# Patient Record
Sex: MALE | Race: WHITE | NOT HISPANIC OR LATINO | Employment: FULL TIME | URBAN - NONMETROPOLITAN AREA
[De-identification: names, ages, dates, MRNs, and addresses within clinical notes are randomized per-mention and may not be internally consistent; named-entity substitution may affect disease eponyms.]

---

## 2020-10-20 ENCOUNTER — OFFICE VISIT (OUTPATIENT)
Dept: CARDIOLOGY | Facility: CLINIC | Age: 55
End: 2020-10-20

## 2020-10-20 VITALS
BODY MASS INDEX: 31.71 KG/M2 | TEMPERATURE: 98.4 F | DIASTOLIC BLOOD PRESSURE: 88 MMHG | WEIGHT: 202 LBS | HEIGHT: 67 IN | HEART RATE: 60 BPM | SYSTOLIC BLOOD PRESSURE: 154 MMHG

## 2020-10-20 DIAGNOSIS — E78.00 HYPERCHOLESTEREMIA: ICD-10-CM

## 2020-10-20 DIAGNOSIS — R06.02 SHORTNESS OF BREATH: ICD-10-CM

## 2020-10-20 DIAGNOSIS — I10 ESSENTIAL HYPERTENSION: ICD-10-CM

## 2020-10-20 DIAGNOSIS — E88.81 METABOLIC SYNDROME: ICD-10-CM

## 2020-10-20 DIAGNOSIS — R07.89 OTHER CHEST PAIN: Primary | ICD-10-CM

## 2020-10-20 PROCEDURE — 99244 OFF/OP CNSLTJ NEW/EST MOD 40: CPT | Performed by: INTERNAL MEDICINE

## 2020-10-20 PROCEDURE — 93000 ELECTROCARDIOGRAM COMPLETE: CPT | Performed by: INTERNAL MEDICINE

## 2020-10-20 RX ORDER — CHOLECALCIFEROL (VITAMIN D3) 50 MCG
2000 TABLET ORAL DAILY
COMMUNITY

## 2020-10-20 NOTE — PROGRESS NOTES
"Chief Complaint   Patient presents with   • Establish Care     PCP referred, HTN, SOB, family history cardiac problems   • Chest Pain     reports having sharp pain occasionally in the morning when he wakes up or late in the afternoon. Not really associated with exertion.    • Shortness of Breath     episode while playing with his grandkid's   • Hypertension     diagnosed last week, PCP started Lisinopril 10 mg daily, took one tab one day, \" dropped my BP to much, I didn't take it anymore\"  107/60   • Labs     most recent labs in chart, has never been on cholesterol medication   • Aspirin     does not take a daily aspirin   • Cardiac history     none        CARDIAC COMPLAINTS  Chest pain and dyspnea      Subjective   Bruce Carrera is a 55 y.o. male came in today for his initial cardiac evaluation.  He has no previously diagnosed hypertension or diabetes.  He has family history of ischemic heart disease.  He has been having problems in the form of sharp left-sided chest pain.  It occurs anytime of the day with no precipitating factors.  He sometimes wakes up in the morning with a sharp pain lasting for few seconds to few minutes.  In the scale of 1-10 it is around 5.  It is not associated with any particular activities and it is not associated with shortness of breath or palpitation.  It is not associated with any nausea or diaphoresis.  He try to do regular exercise.  He tries to walk at least 2 to 3 miles on the treadmill without any problem.  When he plays with his grandchildren though he developed shortness of breath.  He does not lift anything heavy.  He does not take any regular medications other than vitamin D and fish oil.  He did undergo some lab work at your office recently.  His TSH was normal.  His B12 and folic acid level were normal.  His hemoglobin is mildly low at 12 with a crit of 38.  His platelets were normal.  His cholesterol was 197 with a triglyceride of 191 and LDL of 118 with a HDL of 45.  His " renal function and the liver function were within normal limits.  He has no history of smoking.  He drinks about 6 cans of beer a week.  As stated he does have a strong family history of ischemic heart disease.  His father diagnosed with ischemic heart disease when he was quite young and his maternal grandfather also  with an MI.    History reviewed. No pertinent surgical history.    Current Outpatient Medications   Medication Sig Dispense Refill   • Cholecalciferol (Vitamin D) 50 MCG (2000 UT) tablet Take 2,000 Units by mouth Daily.     • Omega-3 Fatty Acids (FISH OIL CONCENTRATE PO) Take  by mouth Daily.       No current facility-administered medications for this visit.            ALLERGIES:  Patient has no known allergies.    Past Medical History:   Diagnosis Date   • Family history of thalassemia    • History of right knee surgery    • Hyperlipidemia    • Vitamin D deficiency        Social History     Tobacco Use   Smoking Status Never Smoker   Smokeless Tobacco Never Used          Family History   Problem Relation Age of Onset   • No Known Problems Mother    • Valvular heart disease Father         s/p valve replacement   • Heart attack Father    • Heart disease Father         CABG, stenting   • Hypertension Father    • Hyperlipidemia Father    • No Known Problems Sister    • No Known Problems Brother    • Other Maternal Grandmother         medical history unknown   • Heart attack Maternal Grandfather    • Cancer Paternal Grandmother    • Other Paternal Grandfather         medical history unknown   • No Known Problems Son    • No Known Problems Son        Review of Systems   Constitution: Negative for decreased appetite and malaise/fatigue.   HENT: Negative for congestion and sore throat.    Eyes: Negative for blurred vision.   Cardiovascular: Positive for chest pain and dyspnea on exertion.   Respiratory: Positive for shortness of breath. Negative for snoring.    Endocrine: Negative for cold intolerance and  "heat intolerance.   Hematologic/Lymphatic: Negative for adenopathy. Does not bruise/bleed easily.   Skin: Negative for itching, nail changes and skin cancer.   Musculoskeletal: Negative for arthritis and myalgias.   Gastrointestinal: Negative for abdominal pain, dysphagia and heartburn.   Genitourinary: Negative for bladder incontinence and frequency.   Neurological: Negative for dizziness, light-headedness, seizures and vertigo.   Psychiatric/Behavioral: Negative for altered mental status.   Allergic/Immunologic: Negative for environmental allergies and hives.       Diabetes- No  Thyroid- normal    Objective     /88 (BP Location: Left arm)   Pulse 60   Temp 98.4 °F (36.9 °C)   Ht 170.2 cm (67\")   Wt 91.6 kg (202 lb)   BMI 31.64 kg/m²     Vitals signs reviewed.   Constitutional:       Appearance: Healthy appearance. Not in distress.   Eyes:      Conjunctiva/sclera: Conjunctivae normal.      Pupils: Pupils are equal, round, and reactive to light.   HENT:      Head: Normocephalic.   Neck:      Musculoskeletal: Normal range of motion and neck supple.   Pulmonary:      Effort: Pulmonary effort is normal.      Breath sounds: Normal breath sounds.   Cardiovascular:      PMI at left midclavicular line. Normal rate. Regular rhythm.      Murmurs: There is a grade 3/6 mid frequency midsystolic murmur at the apex.   Abdominal:      General: Bowel sounds are normal.      Palpations: Abdomen is soft.   Musculoskeletal: Normal range of motion.   Skin:     General: Skin is warm and dry.   Neurological:      Mental Status: Alert, oriented to person, place, and time and oriented to person, place and time.           ECG 12 Lead    Date/Time: 10/20/2020 1:45 PM  Performed by: Estiven José MD  Authorized by: Estiven José MD   Previous ECG: no previous ECG available  Rhythm: sinus rhythm  Rate: normal  QRS axis: normal    Clinical impression: normal ECG              Assessment/Plan   Patient's Body mass index " is 31.64 kg/m². BMI is above normal parameters. Recommendations include: educational material, exercise counseling and nutrition counseling.     Diagnoses and all orders for this visit:    1. Other chest pain (Primary)  -     Treadmill Stress Test; Future    2. Shortness of breath  -     Adult Transthoracic Echo Complete W/ Cont if Necessary Per Protocol; Future    3. Essential hypertension    4. Hypercholesteremia    5. Metabolic syndrome       At baseline his heart rate is stable.  His blood pressure is mildly elevated.  His EKG shows sinus rhythm, no LVH, no ST-T changes.  His clinical examination reveals I schedule him to undergo a stress test BMI of 32.  His cardiovascular examination revealed 3/6 short systolic murmur at the mitral area he has no pedal edema and normal peripheral pulses.    Regarding the chest pain, it does appear to be very atypical.  It is sharp in nature occurring both on exertion as well as at rest.  His EKG did not show any acute changes.  He does have strong family history of ischemic heart disease and he does have elevated cholesterol.  I schedule him to undergo a stress test to evaluate his functional status, chronotropic response, blood pressure response as well as to look for any stress-induced ischemia or arrhythmia.  If he does have increased blood pressure response, need to consider lower dose of ACE inhibitor.    Regarding his shortness of breath, it could be secondary to his metabolic syndrome.  I talked to him about diet and weight reduction.  I gave him papers on Mediterranean diet.  I also schedule him to undergo an echocardiogram to evaluate for LVH, valvular structures, LV function as well as the PA pressure.    Regarding his blood pressure, I talked to him about cutting down on the salt intake.  If the stress test does show increased blood pressure response then that needs to be addressed    Regarding his hypercholesterolemia, advised him to be on the Mediterranean diet  and also talked to him about intermittent fasting diet.  Need to recheck his lipids in about 6 months and if the LDL is still elevated then he needs to be on a statin.  If the stress test shows any changes then he also need to be considered for a static    Regarding his metabolic syndrome, talk to him about continuing daily exercise program as before and to be on a low-carb diet.    Based on the results and his response, further recommendations will be made.             Electronically signed by Estiven José MD October 20, 2020 13:34 EDT

## 2020-10-20 NOTE — PATIENT INSTRUCTIONS
Mediterranean Diet  A Mediterranean diet refers to food and lifestyle choices that are based on the traditions of countries located on the [place]. This way of eating has been shown to help prevent certain conditions and improve outcomes for people who have chronic diseases, like kidney disease and heart disease.  What are tips for following this plan?  Lifestyle  · Cook and eat meals together with your family, when possible.  · Drink enough fluid to keep your urine clear or pale yellow.  · Be physically active every day. This includes:  ? Aerobic exercise like running or swimming.  ? Leisure activities like gardening, walking, or housework.  · Get 7-8 hours of sleep each night.  · If recommended by your health care provider, drink red wine in moderation. This means 1 glass a day for nonpregnant women and 2 glasses a day for men. A glass of wine equals 5 oz (150 mL).  Reading food labels    · Check the serving size of packaged foods. For foods such as rice and pasta, the serving size refers to the amount of cooked product, not dry.  · Check the total fat in packaged foods. Avoid foods that have saturated fat or trans fats.  · Check the ingredients list for added sugars, such as corn syrup.  Shopping  · At the grocery store, buy most of your food from the areas near the walls of the store. This includes:  ? Fresh fruits and vegetables (produce).  ? Grains, beans, nuts, and seeds. Some of these may be available in unpackaged forms or large amounts (in bulk).  ? Fresh seafood.  ? Poultry and eggs.  ? Low-fat dairy products.  · Buy whole ingredients instead of prepackaged foods.  · Buy fresh fruits and vegetables in-season from local farmers markets.  · Buy frozen fruits and vegetables in resealable bags.  · If you do not have access to quality fresh seafood, buy precooked frozen shrimp or canned fish, such as tuna, salmon, or sardines.  · Buy small amounts of raw or cooked vegetables, salads, or olives from  the deli or salad bar at your store.  · Stock your pantry so you always have certain foods on hand, such as olive oil, canned tuna, canned tomatoes, rice, pasta, and beans.  Cooking  · Cook foods with extra-virgin olive oil instead of using butter or other vegetable oils.  · Have meat as a side dish, and have vegetables or grains as your main dish. This means having meat in small portions or adding small amounts of meat to foods like pasta or stew.  · Use beans or vegetables instead of meat in common dishes like chili or lasagna.  · [place] with different cooking methods. Try roasting or broiling vegetables instead of steaming or sautéeing them.  · Add frozen vegetables to soups, stews, pasta, or rice.  · Add nuts or seeds for added healthy fat at each meal. You can add these to yogurt, salads, or vegetable dishes.  · Marinate fish or vegetables using olive oil, lemon juice, garlic, and fresh herbs.  Meal planning    · Plan to eat 1 vegetarian meal one day each week. Try to work up to 2 vegetarian meals, if possible.  · Eat seafood 2 or more times a week.  · Have healthy snacks readily available, such as:  ? Vegetable sticks with hummus.  ? Greek yogurt.  ? Fruit and nut trail mix.  · Eat balanced meals throughout the week. This includes:  ? Fruit: 2-3 servings a day  ? Vegetables: 4-5 servings a day  ? Low-fat dairy: 2 servings a day  ? Fish, poultry, or lean meat: 1 serving a day  ? Beans and legumes: 2 or more servings a week  ? Nuts and seeds: 1-2 servings a day  ? Whole grains: 6-8 servings a day  ? Extra-virgin olive oil: 3-4 servings a day  · Limit red meat and sweets to only a few servings a month  What are my food choices?  · Mediterranean diet  ? Recommended  § Grains: Whole-grain pasta. Brown rice. Bulgar wheat. Polenta. Couscous. Whole-wheat bread. Oatmeal. Quinoa.  § Vegetables: Artichokes. Beets. Broccoli. Cabbage. Carrots. Eggplant. Green beans. Chard. Kale. Spinach. Onions. Leeks. Peas. Squash.  Tomatoes. Peppers. Radishes.  § Fruits: Apples. Apricots. Avocado. Berries. Bananas. Cherries. Dates. Figs. Grapes. Jocelin. Melon. Oranges. Peaches. Plums. Pomegranate.  § Meats and other protein foods: Beans. Almonds. Sunflower seeds. Pine nuts. Peanuts. Cod. [place]. Scallops. Shrimp. Tuna. Tilapia. Clams. Oysters. Eggs.  § Dairy: Low-fat milk. Cheese. Greek yogurt.  § Beverages: Water. Red wine. Herbal tea.  § Fats and oils: Extra virgin olive oil. Avocado oil. Grape seed oil.  § Sweets and desserts: Greek yogurt with honey. Baked apples. Poached pears. Trail mix.  § Seasoning and other foods: Basil. Cilantro. Coriander. Cumin. Mint. Parsley. Eduardo. Rosemary. Tarragon. Garlic. Oregano. Thyme. Pepper. Balsalmic vinegar. Tahini. Hummus. Tomato sauce. Olives. Mushrooms.  ? Limit these  § Grains: Prepackaged pasta or rice dishes. Prepackaged cereal with added sugar.  § Vegetables: Deep fried potatoes (french fries).  § Fruits: Fruit canned in syrup.  § Meats and other protein foods: Beef. Pork. Lamb. Poultry with skin. Hot dogs. [place].  § Dairy: Ice cream. Sour cream. Whole milk.  § Beverages: Juice. Sugar-sweetened soft drinks. Beer. Liquor and spirits.  § Fats and oils: Butter. Canola oil. Vegetable oil. Beef fat (tallow). Lard.  § Sweets and desserts: Cookies. Cakes. Pies. Candy.  § Seasoning and other foods: Mayonnaise. Premade sauces and marinades.  The items listed may not be a complete list. Talk with your dietitian about what dietary choices are right for you.  Summary  · The Mediterranean diet includes both food and lifestyle choices.  · Eat a variety of fresh fruits and vegetables, beans, nuts, seeds, and whole grains.  · Limit the amount of red meat and sweets that you eat.  · Talk with your health care provider about whether it is safe for you to drink red wine in moderation. This means 1 glass a day for nonpregnant women and 2 glasses a day for men. A glass of wine equals 5 oz (150 mL).  This information  is not intended to replace advice given to you by your health care provider. Make sure you discuss any questions you have with your health care provider.  Document Released: 08/10/2017 Document Revised: 08/17/2017 Document Reviewed: 08/10/2017  Elsevier Patient Education © 2020 [employer]

## 2020-10-27 ENCOUNTER — HOSPITAL ENCOUNTER (OUTPATIENT)
Dept: CARDIOLOGY | Facility: HOSPITAL | Age: 55
Discharge: HOME | End: 2020-10-27

## 2020-10-27 VITALS — HEIGHT: 67 IN | WEIGHT: 201.94 LBS | BODY MASS INDEX: 31.7 KG/M2

## 2020-10-27 DIAGNOSIS — R07.89 OTHER CHEST PAIN: ICD-10-CM

## 2020-10-27 DIAGNOSIS — R06.02 SHORTNESS OF BREATH: ICD-10-CM

## 2020-10-27 LAB
AORTIC DIMENSIONLESS INDEX: 0.9 (DI)
BH CV ECHO MEAS - ACS: 1.6 CM
BH CV ECHO MEAS - AO MAX PG (FULL): 1.1 MMHG
BH CV ECHO MEAS - AO MAX PG: 5.3 MMHG
BH CV ECHO MEAS - AO MEAN PG (FULL): 1 MMHG
BH CV ECHO MEAS - AO MEAN PG: 3 MMHG
BH CV ECHO MEAS - AO ROOT AREA (BSA CORRECTED): 1.5
BH CV ECHO MEAS - AO ROOT AREA: 7.5 CM^2
BH CV ECHO MEAS - AO ROOT DIAM: 3.1 CM
BH CV ECHO MEAS - AO V2 MAX: 115 CM/SEC
BH CV ECHO MEAS - AO V2 MEAN: 72.7 CM/SEC
BH CV ECHO MEAS - AO V2 VTI: 26.5 CM
BH CV ECHO MEAS - BSA(HAYCOCK): 2.1 M^2
BH CV ECHO MEAS - BSA: 2 M^2
BH CV ECHO MEAS - BZI_BMI: 31.6 KILOGRAMS/M^2
BH CV ECHO MEAS - BZI_METRIC_HEIGHT: 170.2 CM
BH CV ECHO MEAS - BZI_METRIC_WEIGHT: 91.6 KG
BH CV ECHO MEAS - EDV(CUBED): 139.8 ML
BH CV ECHO MEAS - EDV(TEICH): 128.9 ML
BH CV ECHO MEAS - EF(CUBED): 77 %
BH CV ECHO MEAS - EF(TEICH): 68.7 %
BH CV ECHO MEAS - ESV(CUBED): 32.2 ML
BH CV ECHO MEAS - ESV(TEICH): 40.3 ML
BH CV ECHO MEAS - FS: 38.7 %
BH CV ECHO MEAS - IVS/LVPW: 0.92
BH CV ECHO MEAS - IVSD: 0.84 CM
BH CV ECHO MEAS - LA 3D VOL INDEX: 48
BH CV ECHO MEAS - LA DIMENSION(2D): 4.3 CM
BH CV ECHO MEAS - LA DIMENSION: 4.1 CM
BH CV ECHO MEAS - LA/AO: 1.3
BH CV ECHO MEAS - LAT PEAK E' VEL: 11.8 CM/SEC
BH CV ECHO MEAS - LV IVRT: 0.11 SEC
BH CV ECHO MEAS - LV MASS(C)D: 163.7 GRAMS
BH CV ECHO MEAS - LV MASS(C)DI: 80.6 GRAMS/M^2
BH CV ECHO MEAS - LV MAX PG: 4.2 MMHG
BH CV ECHO MEAS - LV MEAN PG: 2 MMHG
BH CV ECHO MEAS - LV V1 MAX: 102 CM/SEC
BH CV ECHO MEAS - LV V1 MEAN: 63.4 CM/SEC
BH CV ECHO MEAS - LV V1 VTI: 25.1 CM
BH CV ECHO MEAS - LVIDD: 5.2 CM
BH CV ECHO MEAS - LVIDS: 3.2 CM
BH CV ECHO MEAS - LVPWD: 0.92 CM
BH CV ECHO MEAS - MED PEAK E' VEL: 7.1 CM/SEC
BH CV ECHO MEAS - MV A MAX VEL: 73.8 CM/SEC
BH CV ECHO MEAS - MV DEC SLOPE: 395 CM/SEC^2
BH CV ECHO MEAS - MV DEC TIME: 0.21 SEC
BH CV ECHO MEAS - MV E MAX VEL: 88.4 CM/SEC
BH CV ECHO MEAS - MV E/A: 1.2
BH CV ECHO MEAS - MV MAX PG: 3.3 MMHG
BH CV ECHO MEAS - MV MEAN PG: 2 MMHG
BH CV ECHO MEAS - MV P1/2T MAX VEL: 107 CM/SEC
BH CV ECHO MEAS - MV P1/2T: 79.3 MSEC
BH CV ECHO MEAS - MV V2 MAX: 90.4 CM/SEC
BH CV ECHO MEAS - MV V2 MEAN: 60.1 CM/SEC
BH CV ECHO MEAS - MV V2 VTI: 44.1 CM
BH CV ECHO MEAS - MVA P1/2T LCG: 2.1 CM^2
BH CV ECHO MEAS - MVA(P1/2T): 2.8 CM^2
BH CV ECHO MEAS - PA MAX PG (FULL): 1.8 MMHG
BH CV ECHO MEAS - PA MAX PG: 5.1 MMHG
BH CV ECHO MEAS - PA MEAN PG (FULL): 1 MMHG
BH CV ECHO MEAS - PA MEAN PG: 3 MMHG
BH CV ECHO MEAS - PA V2 MAX: 113 CM/SEC
BH CV ECHO MEAS - PA V2 MEAN: 75.5 CM/SEC
BH CV ECHO MEAS - PA V2 VTI: 29.1 CM
BH CV ECHO MEAS - RAP SYSTOLE: 10 MMHG
BH CV ECHO MEAS - RV MAX PG: 3.3 MMHG
BH CV ECHO MEAS - RV MEAN PG: 2 MMHG
BH CV ECHO MEAS - RV V1 MAX: 90.7 CM/SEC
BH CV ECHO MEAS - RV V1 MEAN: 57.5 CM/SEC
BH CV ECHO MEAS - RV V1 VTI: 24.8 CM
BH CV ECHO MEAS - RVDD: 2.8 CM
BH CV ECHO MEAS - RVSP: 25 MMHG
BH CV ECHO MEAS - SI(AO): 98.5 ML/M^2
BH CV ECHO MEAS - SI(CUBED): 53 ML/M^2
BH CV ECHO MEAS - SI(TEICH): 43.6 ML/M^2
BH CV ECHO MEAS - SV(AO): 200 ML
BH CV ECHO MEAS - SV(CUBED): 107.6 ML
BH CV ECHO MEAS - SV(TEICH): 88.6 ML
BH CV ECHO MEAS - TR MAX VEL: 192 CM/SEC
BH CV ECHO MEASUREMENTS AVERAGE E/E' RATIO: 9.35
BH CV STRESS RECOVERY BP: NORMAL MMHG
BH CV STRESS RECOVERY HR: 82 BPM
MAXIMAL PREDICTED HEART RATE: 165 BPM
MAXIMAL PREDICTED HEART RATE: 165 BPM
PERCENT MAX PREDICTED HR: 88.48 %
STRESS BASELINE BP: NORMAL MMHG
STRESS BASELINE HR: 55 BPM
STRESS PERCENT HR: 104 %
STRESS POST ESTIMATED WORKLOAD: 10.1 METS
STRESS POST EXERCISE DUR MIN: 8 MIN
STRESS POST EXERCISE DUR SEC: 45 SEC
STRESS POST PEAK BP: NORMAL MMHG
STRESS POST PEAK HR: 146 BPM
STRESS TARGET HR: 140 BPM
STRESS TARGET HR: 140 BPM

## 2020-10-27 PROCEDURE — 93016 CV STRESS TEST SUPVJ ONLY: CPT | Performed by: NURSE PRACTITIONER

## 2020-10-27 PROCEDURE — 93356 MYOCRD STRAIN IMG SPCKL TRCK: CPT | Performed by: INTERNAL MEDICINE

## 2020-10-27 PROCEDURE — 93018 CV STRESS TEST I&R ONLY: CPT | Performed by: INTERNAL MEDICINE

## 2020-10-27 PROCEDURE — 93356 MYOCRD STRAIN IMG SPCKL TRCK: CPT

## 2020-10-27 PROCEDURE — 93017 CV STRESS TEST TRACING ONLY: CPT

## 2020-10-27 PROCEDURE — 93306 TTE W/DOPPLER COMPLETE: CPT | Performed by: INTERNAL MEDICINE

## 2020-10-27 PROCEDURE — 93306 TTE W/DOPPLER COMPLETE: CPT

## 2020-10-29 RX ORDER — METOPROLOL SUCCINATE 25 MG/1
25 TABLET, EXTENDED RELEASE ORAL DAILY
Qty: 30 TABLET | Refills: 11 | Status: SHIPPED | OUTPATIENT
Start: 2020-10-29 | End: 2021-04-20 | Stop reason: SDUPTHER

## 2020-10-29 NOTE — TELEPHONE ENCOUNTER
Patient was made aware of results of stress test and echo. Increased chronotropic and blood pressure response. EF 56-60%. Patient was made aware of adding metoprolol XL 25 mg daily. Patient was made aware to monitor BP and HR.     Script for metoprolol XL 25 mg daily is pended with 30 tablets and 11 refills to be sent to [facility].

## 2021-04-20 ENCOUNTER — OFFICE VISIT (OUTPATIENT)
Dept: CARDIOLOGY | Facility: CLINIC | Age: 56
End: 2021-04-20

## 2021-04-20 VITALS
DIASTOLIC BLOOD PRESSURE: 90 MMHG | SYSTOLIC BLOOD PRESSURE: 138 MMHG | TEMPERATURE: 98.4 F | WEIGHT: 193 LBS | BODY MASS INDEX: 30.29 KG/M2 | HEART RATE: 72 BPM | HEIGHT: 67 IN

## 2021-04-20 DIAGNOSIS — I10 ESSENTIAL HYPERTENSION: Primary | ICD-10-CM

## 2021-04-20 DIAGNOSIS — E66.9 OBESITY (BMI 30.0-34.9): ICD-10-CM

## 2021-04-20 DIAGNOSIS — E78.00 HYPERCHOLESTEREMIA: ICD-10-CM

## 2021-04-20 PROCEDURE — 99214 OFFICE O/P EST MOD 30 MIN: CPT | Performed by: NURSE PRACTITIONER

## 2021-04-20 RX ORDER — METOPROLOL SUCCINATE 25 MG/1
25 TABLET, EXTENDED RELEASE ORAL DAILY
Qty: 30 TABLET | Refills: 11 | Status: SHIPPED | OUTPATIENT
Start: 2021-04-20 | End: 2021-11-02 | Stop reason: SDDI

## 2021-04-20 RX ORDER — OMEPRAZOLE 40 MG/1
40 CAPSULE, DELAYED RELEASE ORAL DAILY
COMMUNITY

## 2021-04-20 NOTE — PROGRESS NOTES
Chief Complaint   Patient presents with   • Follow-up     cardiac management. pt not taking metoprolol due to the way it made him feel. denies any cardiac complaints.   • Med Refill     will need cardiac meds refilled 90 days to [facility] [place]   • Labs     last labs 9/20 in chart   • Aspirin     is not on daily ASA   • Fatigue     daily.       Cardiac Complaints  fatigue      Subjective   Bruce Carrera is a 55 y.o. male with HTN and hyperlipidemia, who was referred for SOA and family history of IHD in October 2020. He was advised to undergo stress and echo testing to assess for ischemia, LV dysfunction, and valve concerns. Stress was negative for ischemia but did show mild increase in both chronotropic and HTN response, low dose of toprol added once daily.  Lipids were high at consult with LDL of 117 and patient advised on Mediterranean diet with recheck in 6 months, he has continued on fish oil.    He returns today for follow up and cardiac concerns are denied. He reports active lifestyle often walking 3 miles a day without concerns. He does state he has not been taking his BB as was recommended after stress. He reports BP has been okay at home. He is following with you soon for repeat labs.         Cardiac History  Past Surgical History:   Procedure Laterality Date   • CARDIOVASCULAR STRESS TEST  10/27/2020    R.Stress- 8 Min, 45 Secs. 10.1 METS. 88% THR. BP- 168/92. Negative.   • ECHO - CONVERTED  10/27/2020    EF 60%. LA- 4.1 Cm. Mild MR. RVSP- 25 mmHg.       Current Outpatient Medications   Medication Sig Dispense Refill   • Cholecalciferol (Vitamin D) 50 MCG (2000 UT) tablet Take 2,000 Units by mouth Daily.     • Omega-3 Fatty Acids (FISH OIL CONCENTRATE PO) Take  by mouth Daily.     • omeprazole (priLOSEC) 40 MG capsule Take 40 mg by mouth Daily.     • metoprolol succinate XL (TOPROL-XL) 25 MG 24 hr tablet Take 1 tablet by mouth Daily. 30 tablet 11     No current facility-administered medications for  this visit.       Patient has no known allergies.    Past Medical History:   Diagnosis Date   • Family history of thalassemia    • History of right knee surgery    • Hyperlipidemia    • Vitamin D deficiency        Social History     Socioeconomic History   • Marital status:      Spouse name: Not on file   • Number of children: Not on file   • Years of education: Not on file   • Highest education level: Not on file   Tobacco Use   • Smoking status: Never Smoker   • Smokeless tobacco: Never Used   Vaping Use   • Vaping Use: Never used   Substance and Sexual Activity   • Alcohol use: Yes     Alcohol/week: 6.0 standard drinks     Types: 6 Cans of beer per week   • Drug use: Never       Family History   Problem Relation Age of Onset   • No Known Problems Mother    • Valvular heart disease Father         s/p valve replacement   • Heart attack Father    • Heart disease Father         CABG, stenting   • Hypertension Father    • Hyperlipidemia Father    • No Known Problems Sister    • No Known Problems Brother    • Other Maternal Grandmother         medical history unknown   • Heart attack Maternal Grandfather    • Cancer Paternal Grandmother    • Other Paternal Grandfather         medical history unknown   • No Known Problems Son    • No Known Problems Son        Review of Systems   Constitutional: Negative for malaise/fatigue and night sweats.   Cardiovascular: Negative for chest pain, claudication, dyspnea on exertion, irregular heartbeat, leg swelling, near-syncope, orthopnea, palpitations and syncope.   Respiratory: Negative for cough, shortness of breath and wheezing.    Musculoskeletal: Negative for back pain, joint pain, joint swelling and stiffness.   Gastrointestinal: Negative for anorexia, heartburn, melena, nausea and vomiting.   Genitourinary: Negative for dysuria, hematuria, hesitancy and nocturia.   Neurological: Negative for dizziness, headaches and light-headedness.   Psychiatric/Behavioral: Negative  "for depression and memory loss. The patient is not nervous/anxious.            Objective     /90 (BP Location: Right arm)   Pulse 72   Temp 98.4 °F (36.9 °C)   Ht 170 cm (66.93\")   Wt 87.5 kg (193 lb)   BMI 30.29 kg/m²     Constitutional:       Appearance: Not in distress.   Eyes:      Pupils: Pupils are equal, round, and reactive to light.   HENT:      Nose: Nose normal.   Pulmonary:      Effort: Pulmonary effort is normal.      Breath sounds: Normal breath sounds.   Cardiovascular:      PMI at left midclavicular line. Normal rate. Regular rhythm.      Murmurs: There is a systolic murmur.   Abdominal:      Palpations: Abdomen is soft.   Musculoskeletal: Normal range of motion.      Cervical back: Normal range of motion and neck supple. Skin:     General: Skin is warm and dry.   Neurological:      Mental Status: Oriented to person, place and time.         Procedures    Assessment/Plan     HTN:  Blood pressure stable but slightly elevated. He is hesitant to start BP meds and states at home it is well managed. He was urged to try 1/2 tablet per day if needed vs whole tablet.     Increased chronotropic response:  Has been holding BB. He was urged to check HR with exertion as low dose metoprolol may be needed.     Hyperlipidemia:  LDL high at consult, will follow with you soon for repeat labs.  He will continue fish oil therapy.  If lipids high, he will be urged to discuss statin with you.    BMI noted at 30.29 with weight loss noted, patient praised for his efforts.     Refills per request.    6 month follow up recommended or sooner if needed.                 Problems Addressed this Visit        Cardiac and Vasculature    Hypercholesteremia    Essential hypertension - Primary    Relevant Medications    metoprolol succinate XL (TOPROL-XL) 25 MG 24 hr tablet      Other Visit Diagnoses     Obesity (BMI 30.0-34.9)          Diagnoses       Codes Comments    Essential hypertension    -  Primary ICD-10-CM: " I10  ICD-9-CM: 401.9     Hypercholesteremia     ICD-10-CM: E78.00  ICD-9-CM: 272.0     Obesity (BMI 30.0-34.9)     ICD-10-CM: E66.9  ICD-9-CM: 278.00           Patient's Body mass index is 30.29 kg/m². BMI is above normal parameters. Recommendations include: nutrition counseling.            Electronically signed by NATALIE Estrada April 21, 2021 08:44 EDT

## 2021-11-02 ENCOUNTER — OFFICE VISIT (OUTPATIENT)
Dept: CARDIOLOGY | Facility: CLINIC | Age: 56
End: 2021-11-02

## 2021-11-02 VITALS
TEMPERATURE: 97.5 F | HEIGHT: 66 IN | WEIGHT: 198.4 LBS | DIASTOLIC BLOOD PRESSURE: 84 MMHG | HEART RATE: 88 BPM | BODY MASS INDEX: 31.88 KG/M2 | SYSTOLIC BLOOD PRESSURE: 146 MMHG

## 2021-11-02 DIAGNOSIS — R00.2 PALPITATIONS: ICD-10-CM

## 2021-11-02 DIAGNOSIS — E66.2 CLASS 1 OBESITY WITH ALVEOLAR HYPOVENTILATION, SERIOUS COMORBIDITY, AND BODY MASS INDEX (BMI) OF 30.0 TO 30.9 IN ADULT (HCC): ICD-10-CM

## 2021-11-02 DIAGNOSIS — I10 ESSENTIAL HYPERTENSION: Primary | ICD-10-CM

## 2021-11-02 DIAGNOSIS — E78.00 HYPERCHOLESTEREMIA: ICD-10-CM

## 2021-11-02 PROCEDURE — 99213 OFFICE O/P EST LOW 20 MIN: CPT | Performed by: NURSE PRACTITIONER

## 2021-11-02 NOTE — PROGRESS NOTES
Chief Complaint   Patient presents with   • Follow-up     Pt here for cardiac follow up. Pt denies SP, SOB, dizziness, or palpitations.  Pt does not take a daily ASA.   • Med Refill     Pt has not been taking his metoprolol.     • Lab Work     Pt had last labs about 2 months ago.  He states it was at Dr Henao's office.       Cardiac Complaints  none      Subjective   Bruce Carrera is a 56 y.o. male with HTN and hyperlipidemia, who was referred for SOA and family history of IHD in October 2020. He was advised to undergo stress and echo testing to assess for ischemia, LV dysfunction, and valve concerns. Stress was negative for ischemia but did show mild increase in both chronotropic and HTN response, low dose of toprol added once daily.  Lipids were high at consult with LDL of 117 and patient advised on Mediterranean diet with recheck in 6 months, he has continued on fish oil.    Patient comes today for follow up and denies any CP, SOB, dizziness, or palpitations. He states he has not been taking metoprolol therapy because he admitted his BP had been well managed. He also reports HR has been WNL. He reported he had been watching his diet and exercising more which he thought helped, but now he is not walking as frequently or eating as well as he was. He reports blood work recently with [facility] for Thalassemia. Not currently taking prescription wanted.      Cardiac History  Past Surgical History:   Procedure Laterality Date   • CARDIOVASCULAR STRESS TEST  10/27/2020    R.Stress- 8 Min, 45 Secs. 10.1 METS. 88% THR. BP- 168/92. Negative.   • ECHO - CONVERTED  10/27/2020    EF 60%. LA- 4.1 Cm. Mild MR. RVSP- 25 mmHg.       Current Outpatient Medications   Medication Sig Dispense Refill   • Omega-3 Fatty Acids (FISH OIL CONCENTRATE PO) Take  by mouth Daily.     • omeprazole (priLOSEC) 40 MG capsule Take 40 mg by mouth Daily.     • Cholecalciferol (Vitamin D) 50 MCG (2000 UT) tablet Take 2,000 Units by mouth Daily.       No  current facility-administered medications for this visit.       Patient has no known allergies.    Past Medical History:   Diagnosis Date   • Family history of thalassemia    • History of right knee surgery    • Hyperlipidemia    • Thalassemia minor    • Vitamin D deficiency        Social History     Socioeconomic History   • Marital status:    Tobacco Use   • Smoking status: Never Smoker   • Smokeless tobacco: Never Used   Vaping Use   • Vaping Use: Never used   Substance and Sexual Activity   • Alcohol use: Yes     Alcohol/week: 6.0 standard drinks     Types: 6 Cans of beer per week   • Drug use: Never       Family History   Problem Relation Age of Onset   • No Known Problems Mother    • Valvular heart disease Father         s/p valve replacement   • Heart attack Father    • Heart disease Father         CABG, stenting   • Hypertension Father    • Hyperlipidemia Father    • No Known Problems Sister    • No Known Problems Brother    • Other Maternal Grandmother         medical history unknown   • Heart attack Maternal Grandfather    • Cancer Paternal Grandmother    • Other Paternal Grandfather         medical history unknown   • No Known Problems Son    • No Known Problems Son        Review of Systems   Constitutional: Negative for malaise/fatigue and night sweats.   Cardiovascular: Negative for chest pain, claudication, dyspnea on exertion, irregular heartbeat, leg swelling, near-syncope, orthopnea, palpitations and syncope.   Respiratory: Negative for cough, shortness of breath and wheezing.    Musculoskeletal: Positive for stiffness. Negative for back pain and joint pain.   Gastrointestinal: Negative for anorexia, heartburn, melena, nausea and vomiting.   Genitourinary: Negative for dysuria, hematuria, hesitancy and nocturia.   Neurological: Negative for dizziness, light-headedness and loss of balance.   Psychiatric/Behavioral: Negative for depression and memory loss. The patient is not nervous/anxious.   "          Objective     /84 (BP Location: Right arm, Patient Position: Sitting)   Pulse 88   Temp 97.5 °F (36.4 °C)   Ht 167.6 cm (66\")   Wt 90 kg (198 lb 6.4 oz)   BMI 32.02 kg/m²     Constitutional:       Appearance: Not in distress.   Eyes:      Pupils: Pupils are equal, round, and reactive to light.   HENT:      Nose: Nose normal.   Pulmonary:      Effort: Pulmonary effort is normal.      Breath sounds: Normal breath sounds.   Cardiovascular:      PMI at left midclavicular line. Normal rate. Regular rhythm.      Murmurs: There is a systolic murmur.   Abdominal:      Palpations: Abdomen is soft.   Musculoskeletal: Normal range of motion.      Cervical back: Normal range of motion and neck supple. Skin:     General: Skin is warm and dry.   Neurological:      Mental Status: Oriented to person, place and time.         Procedures    Assessment/Plan     HTN:  Blood pressure stable but remains slightly elevated. He remains hesitant to start BP meds and states at home it is well managed. He was urged to continue to limit sodium intake and to begin his walking regimen once again.      Increased chronotropic response:  Has been holding BB. He was urged to check HR with exertion as low dose metoprolol may be needed.      Hyperlipidemia:  LDL high at consult, he has remained followed by your office for lab management.   He will continue fish oil therapy.  If lipids high, he will be urged to discuss statin with you.     BMI noted at 32.02 with weight gain noted, patient urged to limit alcohol intake and walk more frequently.     No refills needed.     6 month follow up recommended or sooner if needed.          Problems Addressed this Visit        Cardiac and Vasculature    Hypercholesteremia    Essential hypertension - Primary      Other Visit Diagnoses     Palpitations        Class 1 obesity with alveolar hypoventilation, serious comorbidity, and body mass index (BMI) of 30.0 to 30.9 in adult (HCC)        "   Diagnoses       Codes Comments    Essential hypertension    -  Primary ICD-10-CM: I10  ICD-9-CM: 401.9     Hypercholesteremia     ICD-10-CM: E78.00  ICD-9-CM: 272.0     Palpitations     ICD-10-CM: R00.2  ICD-9-CM: 785.1     Class 1 obesity with alveolar hypoventilation, serious comorbidity, and body mass index (BMI) of 30.0 to 30.9 in adult (HCC)     ICD-10-CM: E66.2, Z68.30  ICD-9-CM: 278.03, V85.30           Patient's Body mass index is 32.02 kg/m². indicating that he is obese. Good cardiac diet with limited carbs, calories, and activity as tolerated advised.           Electronically signed by NATALIE Estrada November 2, 2021 11:59 EDT